# Patient Record
Sex: FEMALE | URBAN - METROPOLITAN AREA
[De-identification: names, ages, dates, MRNs, and addresses within clinical notes are randomized per-mention and may not be internally consistent; named-entity substitution may affect disease eponyms.]

---

## 2023-02-14 ENCOUNTER — WEB ENCOUNTER (OUTPATIENT)
Dept: URBAN - METROPOLITAN AREA CLINIC 96 | Facility: CLINIC | Age: 63
End: 2023-02-14

## 2023-02-17 ENCOUNTER — OFFICE VISIT (OUTPATIENT)
Dept: URBAN - METROPOLITAN AREA CLINIC 96 | Facility: CLINIC | Age: 63
End: 2023-02-17
Payer: COMMERCIAL

## 2023-02-17 VITALS
SYSTOLIC BLOOD PRESSURE: 141 MMHG | BODY MASS INDEX: 27.79 KG/M2 | TEMPERATURE: 86 F | HEIGHT: 62 IN | WEIGHT: 151 LBS | DIASTOLIC BLOOD PRESSURE: 84 MMHG

## 2023-02-17 DIAGNOSIS — R10.84 ABDOMINAL CRAMPING, GENERALIZED: ICD-10-CM

## 2023-02-17 DIAGNOSIS — R19.8 IRREGULAR BOWEL HABITS: ICD-10-CM

## 2023-02-17 PROCEDURE — 99244 OFF/OP CNSLTJ NEW/EST MOD 40: CPT | Performed by: INTERNAL MEDICINE

## 2023-02-17 PROCEDURE — 99204 OFFICE O/P NEW MOD 45 MIN: CPT | Performed by: INTERNAL MEDICINE

## 2023-02-17 RX ORDER — PANTOPRAZOLE SODIUM 20 MG/1
1 TABLET TABLET, DELAYED RELEASE ORAL
Qty: 45 | Refills: 0 | OUTPATIENT

## 2023-02-17 RX ORDER — METRONIDAZOLE 250 MG/1
1 TABLET TABLET ORAL TWICE A DAY
Qty: 14 TABLET | Refills: 0 | OUTPATIENT

## 2023-02-17 NOTE — HPI-TODAY'S VISIT:
This patient was referred by Dr. Ramila Singleton for an evaluation of abd pain.  A copy of this will be sent to the referring provider.  62 y.o. WF,  Not seen in 3+ yrs COVID 10/22 -- Paxlovid -- terrible mouth -- rebound -- nasal issues -- got med from PCP Since Nov, pain from R hip up to R lower rib -- worse w/ sleeping on that side -- pretty bad Never hurt to the touch -- comes and goess  Then, started w/ gas pain under R rib -- ? stitch -- constant under rib -- burning in nature -- ? MSK b/c on mvmt Then, bm issues - as far back as Nov / Dec - soft, abn times, could be multiple times Blood work was fine w/ PCP per pt Feels like hair in throat on exhaling Haven't tried anything No wt changes

## 2023-03-07 ENCOUNTER — WEB ENCOUNTER (OUTPATIENT)
Dept: URBAN - METROPOLITAN AREA CLINIC 92 | Facility: CLINIC | Age: 63
End: 2023-03-07

## 2023-03-07 RX ORDER — METRONIDAZOLE 250 MG/1
1 TABLET TABLET ORAL TWICE A DAY
Qty: 14 TABLET | Refills: 0
End: 2023-03-14

## 2023-03-08 ENCOUNTER — WEB ENCOUNTER (OUTPATIENT)
Dept: URBAN - METROPOLITAN AREA CLINIC 92 | Facility: CLINIC | Age: 63
End: 2023-03-08

## 2023-03-22 ENCOUNTER — TELEPHONE ENCOUNTER (OUTPATIENT)
Dept: URBAN - METROPOLITAN AREA CLINIC 96 | Facility: CLINIC | Age: 63
End: 2023-03-22

## 2023-04-18 ENCOUNTER — OFFICE VISIT (OUTPATIENT)
Dept: URBAN - METROPOLITAN AREA CLINIC 96 | Facility: CLINIC | Age: 63
End: 2023-04-18

## 2023-11-06 ENCOUNTER — WEB ENCOUNTER (OUTPATIENT)
Dept: URBAN - METROPOLITAN AREA CLINIC 98 | Facility: CLINIC | Age: 63
End: 2023-11-06

## 2023-11-09 ENCOUNTER — CLAIMS CREATED FROM THE CLAIM WINDOW (OUTPATIENT)
Dept: URBAN - METROPOLITAN AREA TELEHEALTH 2 | Facility: TELEHEALTH | Age: 63
End: 2023-11-09
Payer: COMMERCIAL

## 2023-11-09 ENCOUNTER — LAB OUTSIDE AN ENCOUNTER (OUTPATIENT)
Dept: URBAN - METROPOLITAN AREA TELEHEALTH 2 | Facility: TELEHEALTH | Age: 63
End: 2023-11-09

## 2023-11-09 VITALS — HEIGHT: 62 IN

## 2023-11-09 DIAGNOSIS — R10.11 RIGHT UPPER QUADRANT PAIN: ICD-10-CM

## 2023-11-09 DIAGNOSIS — R19.4 CHANGE IN BOWEL HABITS: ICD-10-CM

## 2023-11-09 PROBLEM — 88111009: Status: ACTIVE | Noted: 2023-11-09

## 2023-11-09 PROBLEM — 301717006: Status: ACTIVE | Noted: 2023-11-09

## 2023-11-09 PROCEDURE — 99213 OFFICE O/P EST LOW 20 MIN: CPT | Performed by: PHYSICIAN ASSISTANT

## 2023-11-09 RX ORDER — PANTOPRAZOLE SODIUM 20 MG/1
1 TABLET TABLET, DELAYED RELEASE ORAL
Qty: 45 | Refills: 0 | Status: ON HOLD | COMMUNITY

## 2023-11-09 RX ORDER — DICYCLOMINE HYDROCHLORIDE 20 MG/1
1 TABLET TABLET ORAL
Qty: 90 | Refills: 0 | OUTPATIENT
Start: 2023-11-09 | End: 2023-12-09

## 2023-11-09 NOTE — HPI-TODAY'S VISIT:
2/23: This patient was referred by Dr. Ramila Singleton for an evaluation of abd pain.  A copy of this will be sent to the referring provider.  62 y.o. WF,  Not seen in 3+ yrs COVID 10/22 -- Paxlovid -- terrible mouth -- rebound -- nasal issues -- got med from PCP Since Nov, pain from R hip up to R lower rib -- worse w/ sleeping on that side -- pretty bad Never hurt to the touch -- comes and goess  Then, started w/ gas pain under R rib -- ? stitch -- constant under rib -- burning in nature -- ? MSK b/c on mvmt Then, bm issues - as far back as Nov / Dec - soft, abn times, could be multiple times Blood work was fine w/ PCP per pt Feels like hair in throat on exhaling Haven't tried anything No wt changes -- 11/9/23 Seen over televist to discuss bowel habit changes Describes BMs this year as soft, sticky which is different vs last year, on and off R dull abdominal pain under ribs - worse with movement/touching/breathing DIscussed this earlier in year w Kathryn, PCP, had CT, GI consult Started eating raw organic sourkraut, tried pantoprazole, metronidazole in Feb may have helped Pains improved at that time, stopped pantoprazole However in past week similar pains on R recured and increased strange bowel habits Describes slimy, gray/green BM, reddish brown inside -- cannot identify diet triggers PCP ordered chest CT to further evaluate as feels pain may be overlying ribs -- pending completing this No nausea/vomiting, BMs 1-2x/day,  Does get mid chest discomfort and indigestion, sometimes w eating/spicy foods, improves with tums -- not worsening lately, no dysphagia, pains not worseing with this and this is stable No fever/chills/malaise, has upcoming CPX w PCP May have had increased fatty foods lately

## 2023-11-13 ENCOUNTER — WEB ENCOUNTER (OUTPATIENT)
Dept: URBAN - METROPOLITAN AREA CLINIC 96 | Facility: CLINIC | Age: 63
End: 2023-11-13

## 2023-11-20 ENCOUNTER — WEB ENCOUNTER (OUTPATIENT)
Dept: URBAN - METROPOLITAN AREA CLINIC 50 | Facility: CLINIC | Age: 63
End: 2023-11-20

## 2023-11-20 RX ORDER — PANTOPRAZOLE SODIUM 20 MG/1
1 TABLET TABLET, DELAYED RELEASE ORAL ONCE A DAY
Qty: 30 | Refills: 1 | OUTPATIENT
Start: 2023-11-22

## 2023-11-27 ENCOUNTER — WEB ENCOUNTER (OUTPATIENT)
Dept: URBAN - METROPOLITAN AREA CLINIC 50 | Facility: CLINIC | Age: 63
End: 2023-11-27

## 2023-12-04 ENCOUNTER — DASHBOARD ENCOUNTERS (OUTPATIENT)
Age: 63
End: 2023-12-04

## 2023-12-04 ENCOUNTER — LAB OUTSIDE AN ENCOUNTER (OUTPATIENT)
Dept: URBAN - METROPOLITAN AREA CLINIC 96 | Facility: CLINIC | Age: 63
End: 2023-12-04

## 2023-12-06 ENCOUNTER — ERX REFILL RESPONSE (OUTPATIENT)
Dept: URBAN - METROPOLITAN AREA CLINIC 50 | Facility: CLINIC | Age: 63
End: 2023-12-06

## 2023-12-06 RX ORDER — PANTOPRAZOLE SODIUM 20 MG/1
TAKE 1 TABLET BY MOUTH EVERY DAY FOR 30 DAYS TABLET, DELAYED RELEASE ORAL
Qty: 90 TABLET | Refills: 0 | OUTPATIENT

## 2024-02-12 ENCOUNTER — OV EP (OUTPATIENT)
Dept: URBAN - METROPOLITAN AREA CLINIC 96 | Facility: CLINIC | Age: 64
End: 2024-02-12

## 2024-08-05 ENCOUNTER — OFFICE VISIT (OUTPATIENT)
Dept: URBAN - METROPOLITAN AREA CLINIC 96 | Facility: CLINIC | Age: 64
End: 2024-08-05
Payer: MEDICARE

## 2024-08-05 VITALS
WEIGHT: 152 LBS | DIASTOLIC BLOOD PRESSURE: 85 MMHG | BODY MASS INDEX: 27.97 KG/M2 | SYSTOLIC BLOOD PRESSURE: 137 MMHG | HEART RATE: 88 BPM | TEMPERATURE: 97.7 F | HEIGHT: 62 IN

## 2024-08-05 DIAGNOSIS — R15.9 FULL INCONTINENCE OF FECES: ICD-10-CM

## 2024-08-05 DIAGNOSIS — R15.2 FECAL URGENCY: ICD-10-CM

## 2024-08-05 DIAGNOSIS — R19.5 ABNORMAL STOOLS: ICD-10-CM

## 2024-08-05 PROBLEM — 142251000119102: Status: ACTIVE | Noted: 2024-08-05

## 2024-08-05 PROCEDURE — 99213 OFFICE O/P EST LOW 20 MIN: CPT | Performed by: INTERNAL MEDICINE

## 2024-08-05 NOTE — HPI-TODAY'S VISIT:
63 yo WF Embarassing subjet to talk about - PCP rec come here - 2 events - Dec last yr, April this yr no control of bowels 1st time - woke up w/ it running out of her - no urge - just happened 2nd time - early in AM on way to bm, had a few bm, in waiting room of MD - couldn't make it to bathroom - couldn't hold it - did feel urge  Ever since getting COVID in 2022, can go from normal to soft and liquid Now, pretty much back to normal except those 2 disturbing issues Feels like has some residue after wiping - been forever Other than snacking, fruits / veggies / oatmeal - feels like gets enough fiber - does take fiber tabs too

## 2024-12-27 ENCOUNTER — WEB ENCOUNTER (OUTPATIENT)
Dept: URBAN - METROPOLITAN AREA CLINIC 96 | Facility: CLINIC | Age: 64
End: 2024-12-27

## 2024-12-27 ENCOUNTER — TELEPHONE ENCOUNTER (OUTPATIENT)
Dept: URBAN - METROPOLITAN AREA CLINIC 96 | Facility: CLINIC | Age: 64
End: 2024-12-27

## 2025-01-16 ENCOUNTER — OFFICE VISIT (OUTPATIENT)
Dept: URBAN - METROPOLITAN AREA CLINIC 96 | Facility: CLINIC | Age: 65
End: 2025-01-16

## 2025-01-22 ENCOUNTER — OFFICE VISIT (OUTPATIENT)
Dept: URBAN - METROPOLITAN AREA CLINIC 96 | Facility: CLINIC | Age: 65
End: 2025-01-22